# Patient Record
Sex: FEMALE | Race: WHITE | Employment: OTHER | URBAN - METROPOLITAN AREA
[De-identification: names, ages, dates, MRNs, and addresses within clinical notes are randomized per-mention and may not be internally consistent; named-entity substitution may affect disease eponyms.]

---

## 2017-09-18 ENCOUNTER — APPOINTMENT (OUTPATIENT)
Dept: CT IMAGING | Facility: HOSPITAL | Age: 77
DRG: 292 | End: 2017-09-18
Attending: EMERGENCY MEDICINE
Payer: MEDICARE

## 2017-09-18 ENCOUNTER — APPOINTMENT (OUTPATIENT)
Dept: GENERAL RADIOLOGY | Facility: HOSPITAL | Age: 77
DRG: 292 | End: 2017-09-18
Attending: EMERGENCY MEDICINE
Payer: MEDICARE

## 2017-09-18 ENCOUNTER — HOSPITAL ENCOUNTER (INPATIENT)
Facility: HOSPITAL | Age: 77
LOS: 8 days | Discharge: NURSING, REHAB OR LONG-TERM CARE | DRG: 292 | End: 2017-09-26
Attending: EMERGENCY MEDICINE | Admitting: HOSPITALIST
Payer: MEDICARE

## 2017-09-18 DIAGNOSIS — I48.91 ATRIAL FIBRILLATION WITH CONTROLLED VENTRICULAR RESPONSE (HCC): ICD-10-CM

## 2017-09-18 DIAGNOSIS — R07.9 CHEST PAIN OF UNCERTAIN ETIOLOGY: Primary | ICD-10-CM

## 2017-09-18 DIAGNOSIS — J18.9 COMMUNITY ACQUIRED PNEUMONIA OF RIGHT UPPER LOBE OF LUNG: ICD-10-CM

## 2017-09-18 PROBLEM — D64.9 ANEMIA: Status: ACTIVE | Noted: 2017-09-18

## 2017-09-18 LAB
ANION GAP SERPL CALC-SCNC: 5 MMOL/L (ref 0–18)
BASOPHILS # BLD: 0 K/UL (ref 0–0.2)
BASOPHILS NFR BLD: 1 %
BNP SERPL-MCNC: 297 PG/ML (ref 0–100)
BUN SERPL-MCNC: 8 MG/DL (ref 8–20)
BUN/CREAT SERPL: 10.1 (ref 10–20)
CALCIUM SERPL-MCNC: 8.4 MG/DL (ref 8.5–10.5)
CHLORIDE SERPL-SCNC: 105 MMOL/L (ref 95–110)
CO2 SERPL-SCNC: 31 MMOL/L (ref 22–32)
CREAT SERPL-MCNC: 0.79 MG/DL (ref 0.5–1.5)
D DIMER PPP FEU-MCNC: 2.08 MCG/ML (ref ?–0.78)
EOSINOPHIL # BLD: 0.2 K/UL (ref 0–0.7)
EOSINOPHIL NFR BLD: 5 %
ERYTHROCYTE [DISTWIDTH] IN BLOOD BY AUTOMATED COUNT: 23.7 % (ref 11–15)
GLUCOSE SERPL-MCNC: 98 MG/DL (ref 70–99)
HCT VFR BLD AUTO: 35.3 % (ref 35–48)
HGB BLD-MCNC: 10.9 G/DL (ref 12–16)
INR BLD: 1.2 (ref 0.9–1.2)
LYMPHOCYTES # BLD: 0.3 K/UL (ref 1–4)
LYMPHOCYTES NFR BLD: 9 %
MCH RBC QN AUTO: 23.9 PG (ref 27–32)
MCHC RBC AUTO-ENTMCNC: 31 G/DL (ref 32–37)
MCV RBC AUTO: 77 FL (ref 80–100)
MONOCYTES # BLD: 0.3 K/UL (ref 0–1)
MONOCYTES NFR BLD: 8 %
NEUTROPHILS # BLD AUTO: 2.7 K/UL (ref 1.8–7.7)
NEUTROPHILS NFR BLD: 77 %
OSMOLALITY UR CALC.SUM OF ELEC: 290 MOSM/KG (ref 275–295)
PLATELET # BLD AUTO: 139 K/UL (ref 140–400)
PMV BLD AUTO: 7.4 FL (ref 7.4–10.3)
POTASSIUM SERPL-SCNC: 4.1 MMOL/L (ref 3.3–5.1)
PROTHROMBIN TIME: 14.6 SECONDS (ref 11.8–14.5)
RBC # BLD AUTO: 4.58 M/UL (ref 3.7–5.4)
SODIUM SERPL-SCNC: 141 MMOL/L (ref 136–144)
TROPONIN I SERPL-MCNC: 0.03 NG/ML (ref ?–0.03)
TROPONIN I SERPL-MCNC: 0.03 NG/ML (ref ?–0.03)
WBC # BLD AUTO: 3.5 K/UL (ref 4–11)

## 2017-09-18 PROCEDURE — 71260 CT THORAX DX C+: CPT | Performed by: EMERGENCY MEDICINE

## 2017-09-18 PROCEDURE — 99223 1ST HOSP IP/OBS HIGH 75: CPT | Performed by: HOSPITALIST

## 2017-09-18 PROCEDURE — 71010 XR CHEST AP PORTABLE  (CPT=71010): CPT | Performed by: EMERGENCY MEDICINE

## 2017-09-18 RX ORDER — PANTOPRAZOLE SODIUM 40 MG/1
40 TABLET, DELAYED RELEASE ORAL
Status: DISCONTINUED | OUTPATIENT
Start: 2017-09-19 | End: 2017-09-26

## 2017-09-18 RX ORDER — LAMOTRIGINE 100 MG/1
125 TABLET ORAL EVERY 12 HOURS
COMMUNITY

## 2017-09-18 RX ORDER — B-COMPLEX WITH VITAMIN C
1 TABLET ORAL 2 TIMES DAILY
COMMUNITY

## 2017-09-18 RX ORDER — IBUPROFEN 400 MG/1
400 TABLET ORAL 3 TIMES DAILY
COMMUNITY
End: 2017-09-26

## 2017-09-18 RX ORDER — SODIUM CHLORIDE 0.9 % (FLUSH) 0.9 %
3 SYRINGE (ML) INJECTION AS NEEDED
Status: DISCONTINUED | OUTPATIENT
Start: 2017-09-18 | End: 2017-09-26

## 2017-09-18 RX ORDER — GABAPENTIN 400 MG/1
400 CAPSULE ORAL EVERY 8 HOURS
COMMUNITY

## 2017-09-18 RX ORDER — MAGNESIUM OXIDE 400 MG (241.3 MG MAGNESIUM) TABLET
420 TABLET
Status: DISCONTINUED | OUTPATIENT
Start: 2017-09-19 | End: 2017-09-26

## 2017-09-18 RX ORDER — HYDROCODONE BITARTRATE AND ACETAMINOPHEN 10; 325 MG/1; MG/1
1 TABLET ORAL EVERY 6 HOURS PRN
COMMUNITY
End: 2017-09-26

## 2017-09-18 RX ORDER — SERTRALINE HYDROCHLORIDE 100 MG/1
200 TABLET, FILM COATED ORAL DAILY
Status: DISCONTINUED | OUTPATIENT
Start: 2017-09-18 | End: 2017-09-19

## 2017-09-18 RX ORDER — LIDOCAINE 50 MG/G
2 PATCH TOPICAL EVERY 24 HOURS
COMMUNITY

## 2017-09-18 RX ORDER — ONDANSETRON 2 MG/ML
4 INJECTION INTRAMUSCULAR; INTRAVENOUS EVERY 6 HOURS PRN
Status: DISCONTINUED | OUTPATIENT
Start: 2017-09-18 | End: 2017-09-26

## 2017-09-18 RX ORDER — LEVOTHYROXINE SODIUM 0.1 MG/1
100 TABLET ORAL
COMMUNITY

## 2017-09-18 RX ORDER — BUSPIRONE HYDROCHLORIDE 10 MG/1
20 TABLET ORAL 2 TIMES DAILY
COMMUNITY

## 2017-09-18 RX ORDER — ALBUTEROL SULFATE 90 UG/1
1 AEROSOL, METERED RESPIRATORY (INHALATION) EVERY 6 HOURS PRN
Status: DISCONTINUED | OUTPATIENT
Start: 2017-09-18 | End: 2017-09-26

## 2017-09-18 RX ORDER — LEVOTHYROXINE SODIUM 0.05 MG/1
100 TABLET ORAL
Status: DISCONTINUED | OUTPATIENT
Start: 2017-09-19 | End: 2017-09-26

## 2017-09-18 RX ORDER — PRAVASTATIN SODIUM 40 MG
40 TABLET ORAL NIGHTLY
COMMUNITY

## 2017-09-18 RX ORDER — FUROSEMIDE 10 MG/ML
40 INJECTION INTRAMUSCULAR; INTRAVENOUS
Status: DISCONTINUED | OUTPATIENT
Start: 2017-09-18 | End: 2017-09-21

## 2017-09-18 RX ORDER — BACLOFEN 10 MG/1
10 TABLET ORAL NIGHTLY
COMMUNITY

## 2017-09-18 RX ORDER — ALLOPURINOL 300 MG/1
TABLET ORAL EVERY 8 HOURS PRN
COMMUNITY

## 2017-09-18 RX ORDER — POTASSIUM CITRATE 10 MEQ/1
TABLET, EXTENDED RELEASE ORAL DAILY
COMMUNITY

## 2017-09-18 RX ORDER — POLYETHYLENE GLYCOL 3350 17 G/17G
17 POWDER, FOR SOLUTION ORAL DAILY PRN
Status: DISCONTINUED | OUTPATIENT
Start: 2017-09-18 | End: 2017-09-26

## 2017-09-18 RX ORDER — ASPIRIN 81 MG/1
324 TABLET, CHEWABLE ORAL ONCE
Status: COMPLETED | OUTPATIENT
Start: 2017-09-18 | End: 2017-09-18

## 2017-09-18 RX ORDER — BRIMONIDINE TARTRATE 2 MG/ML
1 SOLUTION/ DROPS OPHTHALMIC 2 TIMES DAILY
COMMUNITY

## 2017-09-18 RX ORDER — BRIMONIDINE TARTRATE 2 MG/ML
1 SOLUTION/ DROPS OPHTHALMIC 2 TIMES DAILY
Status: DISCONTINUED | OUTPATIENT
Start: 2017-09-18 | End: 2017-09-26

## 2017-09-18 RX ORDER — GABAPENTIN 400 MG/1
400 CAPSULE ORAL EVERY 8 HOURS
Status: DISCONTINUED | OUTPATIENT
Start: 2017-09-18 | End: 2017-09-26

## 2017-09-18 RX ORDER — ALBUTEROL SULFATE 90 UG/1
1 AEROSOL, METERED RESPIRATORY (INHALATION) EVERY 6 HOURS PRN
COMMUNITY

## 2017-09-18 RX ORDER — BUDESONIDE AND FORMOTEROL FUMARATE DIHYDRATE 160; 4.5 UG/1; UG/1
2 AEROSOL RESPIRATORY (INHALATION) 2 TIMES DAILY
COMMUNITY

## 2017-09-18 RX ORDER — PANTOPRAZOLE SODIUM 40 MG/1
40 TABLET, DELAYED RELEASE ORAL
COMMUNITY

## 2017-09-18 RX ORDER — DOCUSATE SODIUM 100 MG/1
100 CAPSULE, LIQUID FILLED ORAL 2 TIMES DAILY
Status: DISCONTINUED | OUTPATIENT
Start: 2017-09-18 | End: 2017-09-26

## 2017-09-18 RX ORDER — HYDROCODONE BITARTRATE AND ACETAMINOPHEN 10; 325 MG/1; MG/1
1 TABLET ORAL EVERY 6 HOURS PRN
Status: DISCONTINUED | OUTPATIENT
Start: 2017-09-18 | End: 2017-09-26

## 2017-09-18 RX ORDER — ESTRADIOL 0.05 MG/D
1 PATCH TRANSDERMAL WEEKLY
COMMUNITY

## 2017-09-18 RX ORDER — SODIUM PHOSPHATE, DIBASIC AND SODIUM PHOSPHATE, MONOBASIC 7; 19 G/133ML; G/133ML
1 ENEMA RECTAL ONCE AS NEEDED
Status: DISCONTINUED | OUTPATIENT
Start: 2017-09-18 | End: 2017-09-26

## 2017-09-18 RX ORDER — HEPARIN SODIUM 5000 [USP'U]/ML
5000 INJECTION, SOLUTION INTRAVENOUS; SUBCUTANEOUS EVERY 12 HOURS SCHEDULED
Status: DISCONTINUED | OUTPATIENT
Start: 2017-09-18 | End: 2017-09-26

## 2017-09-18 RX ORDER — MAGNESIUM OXIDE 420 MG
420 TABLET ORAL
COMMUNITY

## 2017-09-18 RX ORDER — BISACODYL 10 MG
10 SUPPOSITORY, RECTAL RECTAL
Status: DISCONTINUED | OUTPATIENT
Start: 2017-09-18 | End: 2017-09-26

## 2017-09-18 RX ORDER — DILTIAZEM HYDROCHLORIDE EXTENDED-RELEASE TABLETS 180 MG/1
180 TABLET, EXTENDED RELEASE ORAL DAILY
COMMUNITY

## 2017-09-18 RX ORDER — BACLOFEN 10 MG/1
10 TABLET ORAL NIGHTLY
Status: DISCONTINUED | OUTPATIENT
Start: 2017-09-18 | End: 2017-09-26

## 2017-09-18 RX ORDER — DILTIAZEM HYDROCHLORIDE 180 MG/1
180 CAPSULE, COATED, EXTENDED RELEASE ORAL DAILY
Status: DISCONTINUED | OUTPATIENT
Start: 2017-09-18 | End: 2017-09-26

## 2017-09-18 RX ORDER — ASPIRIN 325 MG
325 TABLET, DELAYED RELEASE (ENTERIC COATED) ORAL DAILY
Status: DISCONTINUED | OUTPATIENT
Start: 2017-09-18 | End: 2017-09-26

## 2017-09-18 RX ORDER — TRAZODONE HYDROCHLORIDE 100 MG/1
100 TABLET ORAL NIGHTLY PRN
Status: DISCONTINUED | OUTPATIENT
Start: 2017-09-18 | End: 2017-09-26

## 2017-09-18 RX ORDER — LIDOCAINE 50 MG/G
2 PATCH TOPICAL EVERY 24 HOURS
Status: DISCONTINUED | OUTPATIENT
Start: 2017-09-18 | End: 2017-09-18

## 2017-09-18 RX ORDER — FUROSEMIDE 10 MG/ML
40 INJECTION INTRAMUSCULAR; INTRAVENOUS ONCE
Status: COMPLETED | OUTPATIENT
Start: 2017-09-18 | End: 2017-09-18

## 2017-09-18 RX ORDER — LIDOCAINE 50 MG/G
2 PATCH TOPICAL DAILY
Status: DISCONTINUED | OUTPATIENT
Start: 2017-09-19 | End: 2017-09-26

## 2017-09-18 RX ORDER — PRAVASTATIN SODIUM 20 MG
40 TABLET ORAL NIGHTLY
Status: DISCONTINUED | OUTPATIENT
Start: 2017-09-18 | End: 2017-09-26

## 2017-09-18 NOTE — ED INITIAL ASSESSMENT (HPI)
C/O constant centralized chest heaviness that began at around 12:15pm today radiating into bilateral shoulders. Also states LAURENCE and generalized weakness associated with the pain. Denies N/V or dizziness.

## 2017-09-18 NOTE — ED PROVIDER NOTES
Patient Seen in: [facility] AND [facility] Emergency Department    History   Patient presents with:  Chest Pain Angina (cardiovascular)    Stated Complaint:     HPI    The patient is a 69-year-old female with past history of atrial fibrillation, status post pace tenderness  Eyes: Nonicteric sclera, no conjunctival injection  ENT: TMs are clear and flat bilaterally.   There is no posterior pharyngeal erythema  Chest: Clear to auscultation, no tenderness  Cardiovascular: Regular rate and rhythm without murmur  Abdome Abnormal; Notable for the following:     WBC 3.1 (*)     HGB 10.7 (*)     MCV 77.6 (*)     MCH 23.7 (*)     MCHC 30.5 (*)     RDW 23.6 (*)     Lymphocyte Absolute 0.4 (*)     All other components within normal limits   CBC W/ DIFFERENTIAL - Abnormal; Notab Please view results for these tests on the individual orders.    RAINBOW DRAW BLUE   RAINBOW DRAW LAVENDER   RAINBOW LIME GREEN   RAINBOW DRAW LIGHT GREEN   RAINBOW DRAW GOLD   RAINBOW DRAW LAVENDER TALL (BNP)   BLOOD CULTURE   BLOOD CULTURE     EKG

## 2017-09-19 LAB
ALBUMIN SERPL BCP-MCNC: 3.7 G/DL (ref 3.5–4.8)
ALBUMIN/GLOB SERPL: 1.7 {RATIO} (ref 1–2)
ALP SERPL-CCNC: 66 U/L (ref 32–100)
ALT SERPL-CCNC: 16 U/L (ref 14–54)
ANION GAP SERPL CALC-SCNC: 6 MMOL/L (ref 0–18)
AST SERPL-CCNC: 20 U/L (ref 15–41)
BASOPHILS # BLD: 0 K/UL (ref 0–0.2)
BASOPHILS NFR BLD: 1 %
BILIRUB SERPL-MCNC: 0.9 MG/DL (ref 0.3–1.2)
BUN SERPL-MCNC: 9 MG/DL (ref 8–20)
BUN/CREAT SERPL: 10.3 (ref 10–20)
CALCIUM SERPL-MCNC: 8.4 MG/DL (ref 8.5–10.5)
CHLORIDE SERPL-SCNC: 101 MMOL/L (ref 95–110)
CHOLEST SERPL-MCNC: 123 MG/DL (ref 110–200)
CO2 SERPL-SCNC: 35 MMOL/L (ref 22–32)
CREAT SERPL-MCNC: 0.87 MG/DL (ref 0.5–1.5)
EOSINOPHIL # BLD: 0.2 K/UL (ref 0–0.7)
EOSINOPHIL NFR BLD: 6 %
ERYTHROCYTE [DISTWIDTH] IN BLOOD BY AUTOMATED COUNT: 23.6 % (ref 11–15)
GLOBULIN PLAS-MCNC: 2.2 G/DL (ref 2.5–3.7)
GLUCOSE SERPL-MCNC: 100 MG/DL (ref 70–99)
HCT VFR BLD AUTO: 35.1 % (ref 35–48)
HDLC SERPL-MCNC: 56 MG/DL
HGB BLD-MCNC: 10.7 G/DL (ref 12–16)
LDLC SERPL CALC-MCNC: 55 MG/DL (ref 0–99)
LYMPHOCYTES # BLD: 0.4 K/UL (ref 1–4)
LYMPHOCYTES NFR BLD: 14 %
MAGNESIUM SERPL-MCNC: 1.7 MG/DL (ref 1.8–2.5)
MCH RBC QN AUTO: 23.7 PG (ref 27–32)
MCHC RBC AUTO-ENTMCNC: 30.5 G/DL (ref 32–37)
MCV RBC AUTO: 77.6 FL (ref 80–100)
MONOCYTES # BLD: 0.3 K/UL (ref 0–1)
MONOCYTES NFR BLD: 9 %
NEUTROPHILS # BLD AUTO: 2.2 K/UL (ref 1.8–7.7)
NEUTROPHILS NFR BLD: 70 %
NONHDLC SERPL-MCNC: 67 MG/DL
OSMOLALITY UR CALC.SUM OF ELEC: 293 MOSM/KG (ref 275–295)
PLATELET # BLD AUTO: 147 K/UL (ref 140–400)
PMV BLD AUTO: 8 FL (ref 7.4–10.3)
POTASSIUM SERPL-SCNC: 4 MMOL/L (ref 3.3–5.1)
PROT SERPL-MCNC: 5.9 G/DL (ref 5.9–8.4)
RBC # BLD AUTO: 4.52 M/UL (ref 3.7–5.4)
SODIUM SERPL-SCNC: 142 MMOL/L (ref 136–144)
TRIGL SERPL-MCNC: 62 MG/DL (ref 1–149)
TSH SERPL-ACNC: 3.29 UIU/ML (ref 0.45–5.33)
WBC # BLD AUTO: 3.1 K/UL (ref 4–11)

## 2017-09-19 PROCEDURE — 99232 SBSQ HOSP IP/OBS MODERATE 35: CPT | Performed by: HOSPITALIST

## 2017-09-19 RX ORDER — SERTRALINE HYDROCHLORIDE 100 MG/1
200 TABLET, FILM COATED ORAL NIGHTLY
Status: DISCONTINUED | OUTPATIENT
Start: 2017-09-19 | End: 2017-09-26

## 2017-09-19 RX ORDER — MAGNESIUM OXIDE 400 MG (241.3 MG MAGNESIUM) TABLET
400 TABLET ONCE
Status: DISCONTINUED | OUTPATIENT
Start: 2017-09-19 | End: 2017-09-19

## 2017-09-19 NOTE — ED NOTES
Rachel Brown RN has been notified regarding BNP that needs to be collected and stated that she will carry out the order

## 2017-09-19 NOTE — H&P
[facility]    PATIENT'S NAME: Bettie Trenton   ATTENDING PHYSICIAN: Mitcehl Oseguera MD   PATIENT ACCOUNT#:   [id number]    LOCATION:  [address] [place] [address] RECORD #:   [id number]       YOB: 1940  ADMISSION DATE:       09/18/2017 some assistance in her basic activities of daily living. REVIEW OF SYSTEMS:  Progressive dyspnea that has been pronounced for the last 2 days with orthopnea. No fever or chills. No cough. No chest discomfort. No abdominal pain.   She noted increase

## 2017-09-19 NOTE — CONSULTS
Cardiology Consult Note     PRIMARY CARDIOLOGIST: MARIANS      CONSULT FOR: CHEST PAIN      HISTORY: 76 Y/O FEMALE WITH ADMIT WITH PRECORDIAL CHEST PAIN FOR ABOUT 30 MIN  ASSO WITH SOB.   KNOWN AFIB/CHRONIC  AND OFF OF ANTICOAG SECONDARY TO CNS BLEED WITH IN [place]

## 2017-09-19 NOTE — PROGRESS NOTES
[facility]    [facility]    Suzanne Leiva Patient Status:  Inpatient    1940 MRN [id number]   Location [facility] 3W/SW Attending Evin Jones MD   Hosp Day # 1 PCP PHYSICIAN NONSTAFF       Subjective:   Ron Sanderson Oral Nightly   • brimonidine Tartrate  1 drop Both Eyes BID   • Fluticasone Furoate-Vilanterol  1 puff Inhalation Daily   • BusPIRone HCl  20 mg Oral BID   • DilTIAZem HCl ER Coated Beads  180 mg Oral Daily   • gabapentin  400 mg Oral Q8H   • Umeclidinium Ap Portable  (cpt=71010)    Result Date: 9/18/2017  CONCLUSION:   Airspace opacity within the right lower lobe suggestive of atelectasis or pneumonia. Short-term followup suggested to ensure resolution. Mild interstitial edema.          Ct Chest Pain/pe (i fibrillation Low voltage in limb leads. Poor R wave progression consider old anterior wall MI; may be normal variant or related to lead placement  -Nonspecific ST depression + Nonspecific T-abnormality -Nondiagnostic.  ABNORMAL No previous ECGs available El

## 2017-09-19 NOTE — RESPIRATORY THERAPY NOTE
LASHAY ASSESSMENT:     Pt does not have a previous diagnosis of LASHAY. Pt does not routinely use a CPAP device at home. This pt is not suspected to be at high risk for LASHAY and sleep lab packet was not provided to patient for outpatient follow-up.

## 2017-09-19 NOTE — CONSULTS
[facility]    PATIENT'S NAME: Acostaneva Stuart   ATTENDING PHYSICIAN: Brooke Johnson MD   CONSULTING PHYSICIAN: Georgina Lyons.  Edwin Kumar MD   PATIENT ACCOUNT#:   [id number]    LOCATION:  [address] [place] [address] #:   [id number]       DATE OF BIRTH: mother, father, and sister. REVIEW OF SYSTEMS:  Currently 30-35 minutes of precordial chest pain _______  falls, traumas, or injuries. PHYSICAL EXAMINATION:    VITAL SIGNS:  96, irregular, 22, 170/92, SpO2 of 99.   NECK:  No increased jugular venous

## 2017-09-19 NOTE — PROGRESS NOTES
[facility] AND [facility]  MHS/AMG Cardiology Progress Note    Padmaja Primer Patient Status:  Inpatient    1940 MRN [id number]   Location [facility] 3W/SW Attending Molly Guajardo MD   Hosp Day # 1 PCP PHYSICIAN NONSTAFF     65year old female, and effort. Abdomen: Soft, non-tender. BS-present. Extremities: Without clubbing, cyanosis or edema. Peripheral pulses are 2+. Neurologic: Alert and oriented, normal affect. Skin: Warm and dry.        Darlene Bingham MD  9/19/2017  9:21 AM

## 2017-09-20 ENCOUNTER — APPOINTMENT (OUTPATIENT)
Dept: CV DIAGNOSTICS | Facility: HOSPITAL | Age: 77
DRG: 292 | End: 2017-09-20
Attending: INTERNAL MEDICINE
Payer: MEDICARE

## 2017-09-20 ENCOUNTER — APPOINTMENT (OUTPATIENT)
Dept: NUCLEAR MEDICINE | Facility: HOSPITAL | Age: 77
DRG: 292 | End: 2017-09-20
Attending: INTERNAL MEDICINE
Payer: MEDICARE

## 2017-09-20 LAB
ANION GAP SERPL CALC-SCNC: 6 MMOL/L (ref 0–18)
BASOPHILS # BLD: 0 K/UL (ref 0–0.2)
BASOPHILS NFR BLD: 1 %
BUN SERPL-MCNC: 13 MG/DL (ref 8–20)
BUN/CREAT SERPL: 13.3 (ref 10–20)
CALCIUM SERPL-MCNC: 8.7 MG/DL (ref 8.5–10.5)
CHLORIDE SERPL-SCNC: 97 MMOL/L (ref 95–110)
CO2 SERPL-SCNC: 38 MMOL/L (ref 22–32)
CREAT SERPL-MCNC: 0.98 MG/DL (ref 0.5–1.5)
EOSINOPHIL # BLD: 0.2 K/UL (ref 0–0.7)
EOSINOPHIL NFR BLD: 6 %
ERYTHROCYTE [DISTWIDTH] IN BLOOD BY AUTOMATED COUNT: 23.3 % (ref 11–15)
GLUCOSE SERPL-MCNC: 110 MG/DL (ref 70–99)
HCT VFR BLD AUTO: 33.7 % (ref 35–48)
HGB BLD-MCNC: 10.4 G/DL (ref 12–16)
LYMPHOCYTES # BLD: 0.3 K/UL (ref 1–4)
LYMPHOCYTES NFR BLD: 10 %
MAGNESIUM SERPL-MCNC: 1.9 MG/DL (ref 1.8–2.5)
MCH RBC QN AUTO: 23.8 PG (ref 27–32)
MCHC RBC AUTO-ENTMCNC: 30.8 G/DL (ref 32–37)
MCV RBC AUTO: 77.4 FL (ref 80–100)
MONOCYTES # BLD: 0.3 K/UL (ref 0–1)
MONOCYTES NFR BLD: 10 %
NEUTROPHILS # BLD AUTO: 2.5 K/UL (ref 1.8–7.7)
NEUTROPHILS NFR BLD: 74 %
OSMOLALITY UR CALC.SUM OF ELEC: 293 MOSM/KG (ref 275–295)
PLATELET # BLD AUTO: 143 K/UL (ref 140–400)
PMV BLD AUTO: 7.4 FL (ref 7.4–10.3)
POTASSIUM SERPL-SCNC: 4.8 MMOL/L (ref 3.3–5.1)
RBC # BLD AUTO: 4.36 M/UL (ref 3.7–5.4)
SODIUM SERPL-SCNC: 141 MMOL/L (ref 136–144)
WBC # BLD AUTO: 3.4 K/UL (ref 4–11)

## 2017-09-20 PROCEDURE — 78452 HT MUSCLE IMAGE SPECT MULT: CPT | Performed by: INTERNAL MEDICINE

## 2017-09-20 PROCEDURE — 93017 CV STRESS TEST TRACING ONLY: CPT | Performed by: INTERNAL MEDICINE

## 2017-09-20 PROCEDURE — 93306 TTE W/DOPPLER COMPLETE: CPT | Performed by: INTERNAL MEDICINE

## 2017-09-20 PROCEDURE — 4A12XM4 MONITORING OF CARDIAC STRESS, EXTERNAL APPROACH: ICD-10-PCS | Performed by: NUCLEAR MEDICINE

## 2017-09-20 PROCEDURE — B246ZZZ ULTRASONOGRAPHY OF RIGHT AND LEFT HEART: ICD-10-PCS | Performed by: INTERNAL MEDICINE

## 2017-09-20 PROCEDURE — 3E033HZ INTRODUCTION OF RADIOACTIVE SUBSTANCE INTO PERIPHERAL VEIN, PERCUTANEOUS APPROACH: ICD-10-PCS | Performed by: NUCLEAR MEDICINE

## 2017-09-20 PROCEDURE — 99232 SBSQ HOSP IP/OBS MODERATE 35: CPT | Performed by: HOSPITALIST

## 2017-09-20 RX ORDER — 0.9 % SODIUM CHLORIDE 0.9 %
VIAL (ML) INJECTION
Status: COMPLETED
Start: 2017-09-20 | End: 2017-09-20

## 2017-09-20 NOTE — PROGRESS NOTES
[facility]  MHS/AMG Cardiology Progress Note    Morena Freitas Patient Status:  Inpatient    1940 MRN [id number]   Location [facility] 3W/SW Attending Shani Martinez MD   Hosp Day # 2 PCP PHYSICIAN NONSTAFF     65year old female, Peripheral pulses are 2+. Neurologic: Alert and oriented, normal affect. Skin: Warm and dry.        Valente Mcgovern MD  9/19/2017  9:21 AM

## 2017-09-20 NOTE — PROGRESS NOTES
[facility]    Progress Note    Aris Knight Patient Status:  Inpatient    1940 MRN [id number]   Location [facility] 3W/SW Attending Kade Rodriguez MD   [facility] Day # 2 PCP PHYSICIAN NONSTAFF       Subjective:   Ember Guerra 10 mg Oral Nightly   • brimonidine Tartrate  1 drop Both Eyes BID   • Fluticasone Furoate-Vilanterol  1 puff Inhalation Daily   • BusPIRone HCl  20 mg Oral BID   • DilTIAZem HCl ER Coated Beads  180 mg Oral Daily   • gabapentin  400 mg Oral Q8H   • Jason --    AST   --   20   --    ALT   --   16   --    BILT   --   0.9   --    TP   --   5.9   --        Lab Results  Component Value Date   INR 1.2 09/18/2017     Imaging/EKG:   Xr Chest Ap Portable  (cpt=71010)    Result Date: 9/18/2017  CONCLUSION:   Patrice Du have occurred Electronically signed on 09/18/2017 at 16:49 by Corrie Arora MD    Assessment and Plan:     Acute heart failure, not clear if this is systolic or diastolic  Echo reviewed, showing preserved EF, mod-severe TR, mod pulm htn  Cont IV lasix BID  I'

## 2017-09-20 NOTE — PLAN OF CARE
CARDIOVASCULAR - ADULT    • Maintains optimal cardiac output and hemodynamic stability Progressing    • Absence of cardiac arrhythmias or at baseline Progressing        METABOLIC/FLUID AND ELECTROLYTES - ADULT    • Electrolytes maintained within normal hernandez

## 2017-09-21 PROCEDURE — 99232 SBSQ HOSP IP/OBS MODERATE 35: CPT | Performed by: HOSPITALIST

## 2017-09-21 RX ORDER — FUROSEMIDE 40 MG/1
40 TABLET ORAL DAILY
Status: DISCONTINUED | OUTPATIENT
Start: 2017-09-22 | End: 2017-09-25

## 2017-09-21 NOTE — PHYSICAL THERAPY NOTE
PHYSICAL THERAPY EVALUATION - INPATIENT     Room Number: 338/338-A  Evaluation Date: 9/21/2017  Type of Evaluation: Initial  Physician Order: PT Eval and Treat    Presenting Problem:  (Chest pain, A-fib, community acquired pneumonia.)  Reason for MAXIMILIAN R [facility] reviewed. No pertinent surgical history.     HOME SITUATION  Type of Home:  [facility])   Home Layout: One level  Stairs to Enter :  (No stairs, elevator building.)                Drives: No  Patient Owned Equipment: Rolling walker       Prior Level of Indepe Assistance: Not tested       Bed Mobility: N/T    Transfers: Sit to stand to RW/supervision.     Exercise/Education Provided:  Energy conservation  Functional activity tolerated  Gait training  Neuromuscular re-educate  Posture    Patient End of Session: Up

## 2017-09-21 NOTE — OCCUPATIONAL THERAPY NOTE
OCCUPATIONAL THERAPY EVALUATION - INPATIENT     Room Number: 338/338-A  Evaluation Date: 9/21/2017  Type of Evaluation: Initial  Presenting Problem:  (acute heart failure)    Physician Order: IP Consult to Occupational Therapy  Reason for Therapy: ADL/IADL SITUATION  Type of Home:  ([place])  Home Layout: One level               Drives: No       Stairs in Home: 0  Use of Assistive Device(s): RW    Prior Level of Williamson: Prior to admission, patient was independent with self-care.  She uses a RW for E. I. alan Shabazz Dressing: CGA  Lower Extremity Dressing: CGA      Education Provided: Educated patient in role of OT and POC  Patient End of Session: Up in chair;Needs met;Call light within reach;RN aware of session/findings; All patient questions and concerns addressed

## 2017-09-21 NOTE — PROGRESS NOTES
[facility] Heart Cardiology Progress Note      Rylan Olmedo Patient Status:  Inpatient    1940 MRN [id number]   Location [facility] 3W/SW Attending Jacque Vargas MD   Hosp Day # 3 PCP PHYSICIAN MARIS effort  CV: Heart with regular rate and irregularly irregular rhythm,  no murmur  Abd: Abdomen soft, nontender, nondistended, no organomegaly, bowel sounds present  Ext:  no clubbing, no cyanosis,no edema  Neuro: no focal deficits  Skin: no rashes or lesio

## 2017-09-21 NOTE — DISCHARGE PLANNING
9/21CM-MD orders received in regards to discharge planning. Per PT the Patient needs rehab. This Writer met with the Patient at bedside in regards to discharge planning.  The Patient stated that her MDs and she gets all her care at the [place] and would like to

## 2017-09-21 NOTE — PROGRESS NOTES
[facility]    Progress Note    Kevin Cao Patient Status:  Inpatient    1940 MRN [id number]   Location [facility] 3W/SW Attending Lizet Chi MD   [facility] Day # 3 PCP PHYSICIAN NONSTAFF       Subjective:   Farzad Steinberg Oral Daily   • baclofen  10 mg Oral Nightly   • brimonidine Tartrate  1 drop Both Eyes BID   • Fluticasone Furoate-Vilanterol  1 puff Inhalation Daily   • BusPIRone HCl  20 mg Oral BID   • DilTIAZem HCl ER Coated Beads  180 mg Oral Daily   • gabapentin  40 09/18/2017     Imaging/EKG:           Assessment and Plan:     Acute heart failure, not clear if this is systolic or diastolic  Echo reviewed, showing preserved EF, mod-severe TR, mod pulm htn  Switched to PO lasix this AM  I's and O's  daily weights  Card

## 2017-09-22 PROCEDURE — 99232 SBSQ HOSP IP/OBS MODERATE 35: CPT | Performed by: HOSPITALIST

## 2017-09-22 NOTE — PHYSICAL THERAPY NOTE
PHYSICAL THERAPY TREATMENT NOTE - INPATIENT    Room Number: 338/338-A       Presenting Problem:  (Chest pain, A-fib, community acquired pneumonia.)    Problem List  Principal Problem:    Chest pain of uncertain etiology  Active Problems:    Anemia    Comm Static Sitting: Good  Dynamic Sitting: Fair +           Static Standing: Not tested  Dynamic Standing: Not tested    ACTIVITY TOLERANCE  O2 Device: Nasal cannula  Liters of O2:  1    AM-PAC '6-Clicks' INBILLIEEN should be able to ambulate 200 ft without  O2 and O2 Saturation above 90%.    Goal #4   Current Status Not tested

## 2017-09-22 NOTE — PROGRESS NOTES
[facility]    Progress Note    Rylan Olmedo Patient Status:  Inpatient    1940 MRN [id number]   Location [facility] 3W/SW Attending Jacque Castañeda MD   [address] Day # 4 PCP PHYSICIAN NONSTAFF       Subjective:   Florentin Marking brimonidine Tartrate  1 drop Both Eyes BID   • Fluticasone Furoate-Vilanterol  1 puff Inhalation Daily   • BusPIRone HCl  20 mg Oral BID   • DilTIAZem HCl ER Coated Beads  180 mg Oral Daily   • gabapentin  400 mg Oral Q8H   • Umeclidinium Bromide  1 puff I fibrillation  off Coumadin secondary to multiple falls and subdural hematoma in July 2017  Cont cardizem    Hypertension  stable    Microcystic anemia  monitor    History of heart block status post permanent pacemaker  stable     HL  statin    Hypothyroid

## 2017-09-22 NOTE — OCCUPATIONAL THERAPY NOTE
OCCUPATIONAL THERAPY TREATMENT NOTE - INPATIENT     Room Number: 338/338-A      Presenting Problem:  (acute heart failure)    Problem List  Principal Problem:    Chest pain of uncertain etiology  Active Problems:    Anemia    Community acquired pneumonia o personal grooming such as brushing teeth?: None  -   Eating meals?: None    AM-PAC Score:  Score: 20  Approx Degree of Impairment: 38.32%  Standardized Score (AM-PAC Scale): 42.03  CMS Modifier (G-Code): CJ    FUNCTIONAL TRANSFER ASSESSMENT  Supine to Sit

## 2017-09-22 NOTE — DISCHARGE PLANNING
9/22CM-Aby MARTINEZ informed this Writer that they will be reviewing and will follow up on Monday (9/25). This Writer informed the Patient's RN of the above.         9:52a. m.9/22CM-This Writer has left a message for the [place]  ([phone] fax [phone]) sen

## 2017-09-23 LAB
ANION GAP SERPL CALC-SCNC: 4 MMOL/L (ref 0–18)
BASOPHILS # BLD: 0 K/UL (ref 0–0.2)
BASOPHILS NFR BLD: 1 %
BUN SERPL-MCNC: 9 MG/DL (ref 8–20)
BUN/CREAT SERPL: 11.7 (ref 10–20)
CALCIUM SERPL-MCNC: 8.2 MG/DL (ref 8.5–10.5)
CHLORIDE SERPL-SCNC: 100 MMOL/L (ref 95–110)
CO2 SERPL-SCNC: 34 MMOL/L (ref 22–32)
CREAT SERPL-MCNC: 0.77 MG/DL (ref 0.5–1.5)
EOSINOPHIL # BLD: 0.1 K/UL (ref 0–0.7)
EOSINOPHIL NFR BLD: 4 %
ERYTHROCYTE [DISTWIDTH] IN BLOOD BY AUTOMATED COUNT: 22.7 % (ref 11–15)
GLUCOSE SERPL-MCNC: 99 MG/DL (ref 70–99)
HCT VFR BLD AUTO: 35.1 % (ref 35–48)
HGB BLD-MCNC: 10.8 G/DL (ref 12–16)
LYMPHOCYTES # BLD: 0.3 K/UL (ref 1–4)
LYMPHOCYTES NFR BLD: 10 %
MCH RBC QN AUTO: 23.8 PG (ref 27–32)
MCHC RBC AUTO-ENTMCNC: 30.7 G/DL (ref 32–37)
MCV RBC AUTO: 77.5 FL (ref 80–100)
MONOCYTES # BLD: 0.3 K/UL (ref 0–1)
MONOCYTES NFR BLD: 10 %
NEUTROPHILS # BLD AUTO: 2.3 K/UL (ref 1.8–7.7)
NEUTROPHILS NFR BLD: 76 %
OSMOLALITY UR CALC.SUM OF ELEC: 285 MOSM/KG (ref 275–295)
PLATELET # BLD AUTO: 138 K/UL (ref 140–400)
PMV BLD AUTO: 7.9 FL (ref 7.4–10.3)
POTASSIUM SERPL-SCNC: 3.4 MMOL/L (ref 3.3–5.1)
POTASSIUM SERPL-SCNC: 4.4 MMOL/L (ref 3.3–5.1)
RBC # BLD AUTO: 4.54 M/UL (ref 3.7–5.4)
SODIUM SERPL-SCNC: 138 MMOL/L (ref 136–144)
WBC # BLD AUTO: 3 K/UL (ref 4–11)

## 2017-09-23 PROCEDURE — 99231 SBSQ HOSP IP/OBS SF/LOW 25: CPT | Performed by: HOSPITALIST

## 2017-09-23 RX ORDER — POTASSIUM CHLORIDE 20 MEQ/1
40 TABLET, EXTENDED RELEASE ORAL EVERY 4 HOURS
Status: COMPLETED | OUTPATIENT
Start: 2017-09-23 | End: 2017-09-23

## 2017-09-23 NOTE — PLAN OF CARE
CARDIOVASCULAR - ADULT    • Maintains optimal cardiac output and hemodynamic stability Progressing    • Absence of cardiac arrhythmias or at baseline Progressing    VSS      PAIN - ADULT    • Verbalizes/displays adequate comfort level or patient's stated p

## 2017-09-23 NOTE — PROGRESS NOTES
[facility]    Progress Note    Zaida Archer Patient Status:  Inpatient    1940 MRN [id number]   Location [facility] 3W/SW Attending Rosalva Gonzalez MD   [facility] Day # 5 PCP PHYSICIAN NONSTAFF       Subjective:   Sulaiman Zendejas • aspirin  325 mg Oral Daily   • baclofen  10 mg Oral Nightly   • brimonidine Tartrate  1 drop Both Eyes BID   • Fluticasone Furoate-Vilanterol  1 puff Inhalation Daily   • BusPIRone HCl  20 mg Oral BID   • DilTIAZem HCl ER Coated Beads  180 mg Oral Essie weights  Cardiology following  lexiscan was WNL    Chronic atrial fibrillation  off Coumadin secondary to multiple falls and subdural hematoma in July 2017  Cont cardizem    Hypertension  stable    Microcystic anemia  monitor    History of heart block stat

## 2017-09-24 PROCEDURE — 99231 SBSQ HOSP IP/OBS SF/LOW 25: CPT | Performed by: HOSPITALIST

## 2017-09-24 NOTE — OCCUPATIONAL THERAPY NOTE
OCCUPATIONAL THERAPY TREATMENT NOTE - INPATIENT     Room Number: 338/338-A     Presenting Problem:  (acute heart failure)    Problem List  Principal Problem:    Chest pain of uncertain etiology  Active Problems:    Anemia    Community acquired pneumonia of TOLERANCE  O2 Saturation: 96-99%  O2 Device: Nasal cannula  Liters of O2:  3  Shortness of breath  Blood Pressure: 114/49    ACTIVITIES OF DAILY LIVING ASSESSMENT  AM-PAC ‘6-Clicks’ Inpatient Daily Activity Short Form  How much help from another person denton

## 2017-09-24 NOTE — DISCHARGE PLANNING
9/24CM- The Patient is medically stable for discharge. This Writer met with the Patient at bedside in regards to discharge planning. The Patient reiterated that if she doesn't go to the [facility] she will go back to [facility], she is refusing a SNF.  The Patient is

## 2017-09-24 NOTE — PHYSICAL THERAPY NOTE
PHYSICAL THERAPY EVALUATION - INPATIENT     Room Number: 338/338-A  Evaluation Date: 9/21/2017  Type of Evaluation: Initial  Physician Order: PT Eval and Treat    Presenting Problem:  (Chest pain, A-fib, community acquired pneumonia.)  Reason for MAXIMILIAN R [facility] : Good  Frequency (Obs): 3x/week       PHYSICAL THERAPY MEDICAL/SOCIAL HISTORY     History related to current admission: Frequent falls, SDH, A-fib, not on anticoagulation.      Problem List  Principal Problem:    Chest pain of uncertain etiology Sitting down on and standing up from a chair with arms (e.g., wheelchair, bedside commode, etc.): A Little   -   Moving from lying on back to sitting on the side of the bed?: None   How much help from another person does the patient currently need. ..   -

## 2017-09-24 NOTE — PLAN OF CARE
Problem: Patient/Family Goals  Goal: Patient/Family Long Term Goal  Patient's Long Term Goal: To return to optimal level of functioning     Interventions:  - Follow med plan   - See additional Care Plan goals for specific interventions    Outcome: Progress

## 2017-09-24 NOTE — PROGRESS NOTES
[facility]    Progress Note    [facility] Patient Status:  Inpatient    1940 MRN [id number]   Location [facility] 3W/SW Attending Carmen Lowe MD   [address] Day # 6 PCP PHYSICIAN NONSTAFF       Subjective:   Marlen Vazquez mg Oral BID   • aspirin  325 mg Oral Daily   • baclofen  10 mg Oral Nightly   • brimonidine Tartrate  1 drop Both Eyes BID   • Fluticasone Furoate-Vilanterol  1 puff Inhalation Daily   • BusPIRone HCl  20 mg Oral BID   • DilTIAZem HCl ER Coated Beads  180 showing preserved EF, mod-severe TR, mod pulm htn  Switched to PO lasix  I's and O's  daily weights  Cardiology consulted  Valente Seo was WNL    Chronic atrial fibrillation  off Coumadin secondary to multiple falls and subdural hematoma in July 2017  Cont ca

## 2017-09-25 PROCEDURE — 99231 SBSQ HOSP IP/OBS SF/LOW 25: CPT | Performed by: HOSPITALIST

## 2017-09-25 RX ORDER — FUROSEMIDE 40 MG/1
40 TABLET ORAL
Status: DISCONTINUED | OUTPATIENT
Start: 2017-09-25 | End: 2017-09-26

## 2017-09-25 RX ORDER — FUROSEMIDE 40 MG/1
40 TABLET ORAL
Qty: 60 TABLET | Refills: 0 | Status: SHIPPED | OUTPATIENT
Start: 2017-09-25 | End: 2017-09-26

## 2017-09-25 RX ORDER — LISINOPRIL 5 MG/1
5 TABLET ORAL DAILY
Qty: 30 TABLET | Refills: 0 | Status: SHIPPED | OUTPATIENT
Start: 2017-09-25 | End: 2017-09-26

## 2017-09-25 NOTE — DISCHARGE PLANNING
9/25[facility] informed this Writer that they are still reviewing and will follow up with Case Management. Case Management to update as information becomes available.      11:36a.m.9/25Elliot at the [place] is currently reviewing the referral and checking to see

## 2017-09-25 NOTE — PROGRESS NOTES
[facility] AND [facility]  MHS/AMG Cardiology Progress Note    Morena Freitas Patient Status:  Inpatient    1940 MRN [id number]   Location [facility] 3W/SW Attending Shani Martinez MD   Hosp Day # 7 PCP PHYSICIAN NONSTAFF     65year old female, excursions and effort. Abdomen: Soft, non-tender. BS-present. Extremities: Without clubbing, cyanosis or edema. Peripheral pulses are 2+. Neurologic: Alert and oriented, normal affect. Skin: Warm and dry.        Jesus Arevalo MD  9/19/2017  9:21 AM

## 2017-09-25 NOTE — PLAN OF CARE
Problem: Patient/Family Goals  Goal: Patient/Family Short Term Goal  Patient's Short Term Goal: To breathe better     Interventions:   - Follow med plan  - See additional Care Plan goals for specific interventions    Outcome: Progressing  Patient appears t

## 2017-09-25 NOTE — DISCHARGE SUMMARY
[facility]    Discharge Summary    [facility] Patient Status:  Inpatient    1940 MRN [id number]   Location [facility] 3W/SW Attending Ramin Padilla MD   Hosp Day # 7 PCP PHYSICIAN NONSTAFF     Date of Admission:  was no edema    History of Present Illness:   Per Dr. Annelise Rivera  The patient is a 26-year-old  female who presented to the emergency department with a complaint of shortness of breath. Troponin was 0.03.  D-dimer 2.08.   White blood cell count of 3 Tabs  Commonly known as:  ZOLOFT  What changed:  how much to take      Take 1 tablet by mouth daily.    Quantity:  30 tablet  Refills:  0        CONTINUE taking these medications      Instructions Prescription details   Albuterol Sulfate  (90 Base) M Calcium/D 500-200 MG-UNIT Tabs      Take 1 tablet by mouth 2 (two) times daily.    Refills:  0     Pantoprazole Sodium 40 MG Tbec  Commonly known as:  PROTONIX      Take 40 mg by mouth every morning before breakfast.   Refills:  0     Potassium Citrate ER 1

## 2017-09-26 VITALS
SYSTOLIC BLOOD PRESSURE: 139 MMHG | BODY MASS INDEX: 31 KG/M2 | OXYGEN SATURATION: 96 % | HEART RATE: 95 BPM | TEMPERATURE: 98 F | WEIGHT: 190.5 LBS | RESPIRATION RATE: 15 BRPM | DIASTOLIC BLOOD PRESSURE: 76 MMHG

## 2017-09-26 PROCEDURE — 99238 HOSP IP/OBS DSCHRG MGMT 30/<: CPT | Performed by: HOSPITALIST

## 2017-09-26 RX ORDER — FUROSEMIDE 40 MG/1
40 TABLET ORAL
Qty: 60 TABLET | Refills: 0 | Status: SHIPPED | OUTPATIENT
Start: 2017-09-26

## 2017-09-26 RX ORDER — LISINOPRIL 5 MG/1
5 TABLET ORAL DAILY
Qty: 30 TABLET | Refills: 0 | Status: SHIPPED | OUTPATIENT
Start: 2017-09-26

## 2017-09-26 NOTE — CM/SW NOTE
Spoke with Dr. Angel Patino regarding discharge plan. He is aware that GEORGE/TABITHA is still waiting to hear from the [place] in regards to bed availability. He reiterated patient needs rehab. [address] notified.       Aldo Borrego, [facility]

## 2017-09-26 NOTE — PLAN OF CARE
CARDIOVASCULAR - ADULT    • Maintains optimal cardiac output and hemodynamic stability Adequate for Discharge    • Absence of cardiac arrhythmias or at baseline Adequate for Discharge        METABOLIC/FLUID AND ELECTROLYTES - ADULT    • Electrolytes mainta

## 2017-09-26 NOTE — DISCHARGE SUMMARY
[facility]    Discharge Summary    [facility] Patient Status:  Inpatient    1940 MRN [id number]   Location [facility] 3W/SW Attending Symone Malone MD   Hosp Day # 8 PCP PHYSICIAN NONSTAFF     Date of Admission:  There was no edema    History of Present Illness:   Per Dr. Felix Baumann  The patient is a 65-year-old  female who presented to the emergency department with a complaint of shortness of breath. Troponin was 0.03.  D-dimer 2.08.   White blood cell coun Tabs  Commonly known as:  ZOLOFT  What changed:  how much to take      Take 1 tablet by mouth daily.    Quantity:  30 tablet  Refills:  0        CONTINUE taking these medications      Instructions Prescription details   Albuterol Sulfate  (90 Base) M Calcium/D 500-200 MG-UNIT Tabs      Take 1 tablet by mouth 2 (two) times daily.    Refills:  0     Pantoprazole Sodium 40 MG Tbec  Commonly known as:  PROTONIX      Take 40 mg by mouth every morning before breakfast.   Refills:  0     Potassium Citrate ER 1

## 2017-09-26 NOTE — DISCHARGE PLANNING
9/26Juan Daniel from the [facility] informed this Writer that she had spoken to the Patient early today (9/26) and requested Murray Salter ([phone] pager) to contact the Patient. This Writer paged Murray Salter at 2:15 p.m  and still has not heard back from her.  This Writer [id number]

## 2017-09-26 NOTE — PROGRESS NOTES
[facility] AND [facility]  MHS/AMG Cardiology Progress Note    Dalton Lu Patient Status:  Inpatient    1940 MRN [id number]   Location [facility] 3W/SW Attending Jean Orr MD   Hosp Day # 8 PCP PHYSICIAN NONSTAFF     65year old female, ++murmur, no pericardial rub, S3.  Lungs: crackles at the bases; no rales, rhonchi or dullness. Normal excursions and effort. Abdomen: Soft, non-tender. BS-present. Extremities: Without clubbing, cyanosis or edema. Peripheral pulses are 2+.   Neurologic

## 2019-07-19 ENCOUNTER — APPOINTMENT (OUTPATIENT)
Dept: CT IMAGING | Facility: HOSPITAL | Age: 79
End: 2019-07-19
Attending: EMERGENCY MEDICINE
Payer: MEDICARE

## 2019-07-19 ENCOUNTER — HOSPITAL ENCOUNTER (EMERGENCY)
Facility: HOSPITAL | Age: 79
Discharge: HOME | End: 2019-07-19
Attending: EMERGENCY MEDICINE
Payer: MEDICARE

## 2019-07-19 ENCOUNTER — APPOINTMENT (OUTPATIENT)
Dept: GENERAL RADIOLOGY | Facility: HOSPITAL | Age: 79
End: 2019-07-19
Attending: EMERGENCY MEDICINE
Payer: MEDICARE

## 2019-07-19 VITALS
RESPIRATION RATE: 20 BRPM | OXYGEN SATURATION: 97 % | HEIGHT: 68 IN | TEMPERATURE: 97 F | DIASTOLIC BLOOD PRESSURE: 58 MMHG | WEIGHT: 200 LBS | SYSTOLIC BLOOD PRESSURE: 111 MMHG | HEART RATE: 63 BPM | BODY MASS INDEX: 30.31 KG/M2

## 2019-07-19 DIAGNOSIS — M54.2 CHRONIC NECK PAIN: Primary | ICD-10-CM

## 2019-07-19 DIAGNOSIS — G89.29 CHRONIC NECK PAIN: Primary | ICD-10-CM

## 2019-07-19 LAB
ANION GAP SERPL CALC-SCNC: 5 MMOL/L (ref 0–18)
BASOPHILS # BLD AUTO: 0.04 X10(3) UL (ref 0–0.2)
BASOPHILS NFR BLD AUTO: 1.1 %
BUN BLD-MCNC: 13 MG/DL (ref 7–18)
BUN/CREAT SERPL: 12.7 (ref 10–20)
CALCIUM BLD-MCNC: 8.4 MG/DL (ref 8.5–10.1)
CHLORIDE SERPL-SCNC: 107 MMOL/L (ref 98–112)
CO2 SERPL-SCNC: 31 MMOL/L (ref 21–32)
CREAT BLD-MCNC: 1.02 MG/DL (ref 0.55–1.02)
DEPRECATED RDW RBC AUTO: 52.8 FL (ref 35.1–46.3)
EOSINOPHIL # BLD AUTO: 0.25 X10(3) UL (ref 0–0.7)
EOSINOPHIL NFR BLD AUTO: 6.9 %
ERYTHROCYTE [DISTWIDTH] IN BLOOD BY AUTOMATED COUNT: 15.9 % (ref 11–15)
GLUCOSE BLD-MCNC: 117 MG/DL (ref 70–99)
HCT VFR BLD AUTO: 36.6 % (ref 35–48)
HGB BLD-MCNC: 11.2 G/DL (ref 12–16)
IMM GRANULOCYTES # BLD AUTO: 0.01 X10(3) UL (ref 0–1)
IMM GRANULOCYTES NFR BLD: 0.3 %
LYMPHOCYTES # BLD AUTO: 0.57 X10(3) UL (ref 1–4)
LYMPHOCYTES NFR BLD AUTO: 15.7 %
MCH RBC QN AUTO: 27.9 PG (ref 26–34)
MCHC RBC AUTO-ENTMCNC: 30.6 G/DL (ref 31–37)
MCV RBC AUTO: 91 FL (ref 80–100)
MONOCYTES # BLD AUTO: 0.36 X10(3) UL (ref 0.1–1)
MONOCYTES NFR BLD AUTO: 9.9 %
NEUTROPHILS # BLD AUTO: 2.41 X10 (3) UL (ref 1.5–7.7)
NEUTROPHILS # BLD AUTO: 2.41 X10(3) UL (ref 1.5–7.7)
NEUTROPHILS NFR BLD AUTO: 66.1 %
NT-PROBNP SERPL-MCNC: 2319 PG/ML (ref ?–450)
OSMOLALITY SERPL CALC.SUM OF ELEC: 297 MOSM/KG (ref 275–295)
PLATELET # BLD AUTO: 163 10(3)UL (ref 150–450)
POTASSIUM SERPL-SCNC: 3.9 MMOL/L (ref 3.5–5.1)
RBC # BLD AUTO: 4.02 X10(6)UL (ref 3.8–5.3)
SODIUM SERPL-SCNC: 143 MMOL/L (ref 136–145)
TROPONIN I SERPL-MCNC: <0.045 NG/ML (ref ?–0.04)
WBC # BLD AUTO: 3.6 X10(3) UL (ref 4–11)

## 2019-07-19 PROCEDURE — 36415 COLL VENOUS BLD VENIPUNCTURE: CPT

## 2019-07-19 PROCEDURE — 72125 CT NECK SPINE W/O DYE: CPT | Performed by: EMERGENCY MEDICINE

## 2019-07-19 PROCEDURE — 70450 CT HEAD/BRAIN W/O DYE: CPT | Performed by: EMERGENCY MEDICINE

## 2019-07-19 PROCEDURE — 99284 EMERGENCY DEPT VISIT MOD MDM: CPT

## 2019-07-19 PROCEDURE — 84484 ASSAY OF TROPONIN QUANT: CPT | Performed by: EMERGENCY MEDICINE

## 2019-07-19 PROCEDURE — 93010 ELECTROCARDIOGRAM REPORT: CPT | Performed by: EMERGENCY MEDICINE

## 2019-07-19 PROCEDURE — 93005 ELECTROCARDIOGRAM TRACING: CPT

## 2019-07-19 PROCEDURE — 83880 ASSAY OF NATRIURETIC PEPTIDE: CPT | Performed by: EMERGENCY MEDICINE

## 2019-07-19 PROCEDURE — 94640 AIRWAY INHALATION TREATMENT: CPT

## 2019-07-19 PROCEDURE — 71045 X-RAY EXAM CHEST 1 VIEW: CPT | Performed by: EMERGENCY MEDICINE

## 2019-07-19 PROCEDURE — 85025 COMPLETE CBC W/AUTO DIFF WBC: CPT | Performed by: EMERGENCY MEDICINE

## 2019-07-19 PROCEDURE — 80048 BASIC METABOLIC PNL TOTAL CA: CPT | Performed by: EMERGENCY MEDICINE

## 2019-07-19 RX ORDER — MORPHINE SULFATE 4 MG/ML
2 INJECTION, SOLUTION INTRAMUSCULAR; INTRAVENOUS ONCE
Status: DISCONTINUED | OUTPATIENT
Start: 2019-07-19 | End: 2019-07-19

## 2019-07-19 RX ORDER — DIAZEPAM 5 MG/1
5 TABLET ORAL ONCE
Status: DISCONTINUED | OUTPATIENT
Start: 2019-07-19 | End: 2019-07-19

## 2019-07-19 RX ORDER — IPRATROPIUM BROMIDE AND ALBUTEROL SULFATE 2.5; .5 MG/3ML; MG/3ML
3 SOLUTION RESPIRATORY (INHALATION) ONCE
Status: COMPLETED | OUTPATIENT
Start: 2019-07-19 | End: 2019-07-19

## 2019-07-19 RX ORDER — MORPHINE SULFATE 4 MG/ML
4 INJECTION, SOLUTION INTRAMUSCULAR; INTRAVENOUS ONCE
Status: DISCONTINUED | OUTPATIENT
Start: 2019-07-19 | End: 2019-07-19

## 2019-07-19 NOTE — ED NOTES
[facility] called to arrange for transportation back to [facility] ETA 30 minutes. Patient aware.

## 2019-07-19 NOTE — ED PROVIDER NOTES
Patient Seen in: [facility] AND [facility] Emergency Department    History   Patient presents with:  Dyspnea LAURENCE SOB (respiratory)    Stated Complaint: FALL, SHOULDER PAIN    HPI    79-year-old female with history listed below on chronic as needed O2 at [facility] clear otherwise per  Abdominal: Soft. There is no tenderness. There is no guarding. Musculoskeletal: Normal range of motion. No edema or tenderness. Neurological: Alert and oriented to person, place, and time.   Normal  strength and plantar flexion Atrial Fibrillation  Reading: Atrial fibrillation, no acute ischemia, nonspecific lateral ST segment and T wave changes somewhat similar to prior but slightly more pronounced today              Ct Brain Or Head ([address])    Result Date: 7/19/2019  PROCEDURE: suggested. There is no apparent depressed fracture, mass, or other significant visible lesion. SINUSES: Limited views demonstrate no significant mucosal thickening or fluid. ORBITS: Limited views are notable for postoperative changes of the right lens. spine, most pronounced at C6-C7. CRANIOCERVICAL AREA: Normal foramen magnum without Chiari malformation. PARASPINAL AREA: No visible mass. OTHER: There is no visible swelling of the prevertebral soft tissues.   Partially imaged chronic right occipital lob diagnosis)    Disposition:  Discharge  7/19/2019  2:11 pm    Follow-up:  Vinicius Gold MD  [address]  [address] [facility]  [phone]    Schedule an appointment as soon as possible for a visit in 3 days      We recommend that you schedule follow

## 2019-07-19 NOTE — ED INITIAL ASSESSMENT (HPI)
Pt from [facility] for dyspnea. Per EMS upon arrival pt was 89% on RA -- pt up to 95% with 2L O2. Denies chest pain +cough, no fevers. Pt uses O2 at home intermittently. C/o Thomas shoulder pain -- took norco at home.

## (undated) NOTE — ED AVS SNAPSHOT
Rylan Olmedo   MRN: [id number]    Department:  [facility] Emergency Department   Date of Visit:  7/19/2019           Disclosure     Insurance plans vary and the physician(s) referred by the ER may not be covered by your plan.  Please contact y within the next three months to obtain basic health screening including reassessment of your blood pressure.     IF THERE IS ANY CHANGE OR WORSENING OF YOUR CONDITION, CALL YOUR PRIMARY CARE PHYSICIAN AT ONCE OR RETURN IMMEDIATELY TO THE EMERGENCY DEPARTMEN